# Patient Record
Sex: FEMALE | Race: WHITE
[De-identification: names, ages, dates, MRNs, and addresses within clinical notes are randomized per-mention and may not be internally consistent; named-entity substitution may affect disease eponyms.]

---

## 2017-01-06 ENCOUNTER — HOSPITAL ENCOUNTER (OUTPATIENT)
Age: 67
Discharge: HOME | End: 2017-01-06
Payer: COMMERCIAL

## 2017-01-06 DIAGNOSIS — K21.9: Primary | ICD-10-CM

## 2017-01-23 ENCOUNTER — HOSPITAL ENCOUNTER (OUTPATIENT)
Age: 67
Discharge: HOME | End: 2017-01-23
Payer: COMMERCIAL

## 2017-01-23 DIAGNOSIS — K21.9: Primary | ICD-10-CM

## 2017-12-07 ENCOUNTER — HOSPITAL ENCOUNTER (OUTPATIENT)
Dept: HOSPITAL 76 - LAB.F | Age: 67
Discharge: HOME | End: 2017-12-07
Attending: PHYSICIAN ASSISTANT
Payer: MEDICARE

## 2017-12-07 DIAGNOSIS — E03.9: ICD-10-CM

## 2017-12-07 DIAGNOSIS — E11.9: ICD-10-CM

## 2017-12-07 DIAGNOSIS — E78.5: ICD-10-CM

## 2017-12-07 DIAGNOSIS — Z51.81: Primary | ICD-10-CM

## 2017-12-07 LAB
ALBUMIN/GLOB SERPL: 1.4 {RATIO} (ref 1–2.2)
ANION GAP SERPL CALCULATED.4IONS-SCNC: 5 MMOL/L (ref 6–13)
BASOPHILS NFR BLD AUTO: 0 10^3/UL (ref 0–0.1)
BASOPHILS NFR BLD AUTO: 0.5 %
BILIRUB BLD-MCNC: 0.5 MG/DL (ref 0.2–1)
BUN SERPL-MCNC: 17 MG/DL (ref 6–20)
CALCIUM UR-MCNC: 9.4 MG/DL (ref 8.5–10.3)
CHLORIDE SERPL-SCNC: 105 MMOL/L (ref 101–111)
CHOLEST SERPL-MCNC: 147 MG/DL
CO2 SERPL-SCNC: 28 MMOL/L (ref 21–32)
CREAT SERPLBLD-SCNC: 0.9 MG/DL (ref 0.4–1)
EOSINOPHIL # BLD AUTO: 0.2 10^3/UL (ref 0–0.7)
EOSINOPHIL NFR BLD AUTO: 2.9 %
ERYTHROCYTE [DISTWIDTH] IN BLOOD BY AUTOMATED COUNT: 13.5 % (ref 12–15)
EST. AVERAGE GLUCOSE BLD GHB EST-MCNC: 123 MG/DL (ref 70–100)
GFRSERPLBLD MDRD-ARVRAT: 63 ML/MIN/{1.73_M2} (ref 89–?)
GLOBULIN SER-MCNC: 2.9 G/DL (ref 2.1–4.2)
GLUCOSE SERPL-MCNC: 105 MG/DL (ref 70–100)
HBA1C BLD-MCNC: 0.62 G/DL
HCT VFR BLD AUTO: 42.6 % (ref 37–47)
HDLC SERPL-MCNC: 41 MG/DL
HDLC SERPL: 3.6 {RATIO} (ref ?–4.4)
HGB UR QL STRIP: 14 G/DL (ref 12–16)
LDLC/HDLC SERPL: 1.6 {RATIO} (ref ?–4.4)
LYMPHOCYTES # SPEC AUTO: 2.7 10^3/UL (ref 1.5–3.5)
LYMPHOCYTES NFR BLD AUTO: 33.4 %
MCH RBC QN AUTO: 30.6 PG (ref 27–31)
MCHC RBC AUTO-ENTMCNC: 32.8 G/DL (ref 32–36)
MCV RBC AUTO: 93.4 FL (ref 81–99)
MONOCYTES # BLD AUTO: 0.6 10^3/UL (ref 0–1)
MONOCYTES NFR BLD AUTO: 7.5 %
NEUTROPHILS # BLD AUTO: 4.6 10^3/UL (ref 1.5–6.6)
NEUTROPHILS # SNV AUTO: 8.2 X10^3/UL (ref 4.8–10.8)
NEUTROPHILS NFR BLD AUTO: 55.7 %
NRBC # BLD AUTO: 0 /100WBC
PDW BLD AUTO: 9.6 FL (ref 7.9–10.8)
POTASSIUM SERPL-SCNC: 4.3 MMOL/L (ref 3.5–5)
PROT SPEC-MCNC: 7 G/DL (ref 6.7–8.2)
RBC MAR: 4.57 10^6/UL (ref 4.2–5.4)
SODIUM SERPLBLD-SCNC: 138 MMOL/L (ref 135–145)
TRIGL P FAST SERPL-MCNC: 203 MG/DL
VLDLC SERPL-SCNC: 41 MG/DL
WBC # BLD: 8.2 X10^3/UL

## 2017-12-07 PROCEDURE — 83036 HEMOGLOBIN GLYCOSYLATED A1C: CPT

## 2017-12-07 PROCEDURE — 80061 LIPID PANEL: CPT

## 2017-12-07 PROCEDURE — 84443 ASSAY THYROID STIM HORMONE: CPT

## 2017-12-07 PROCEDURE — 36415 COLL VENOUS BLD VENIPUNCTURE: CPT

## 2017-12-07 PROCEDURE — 85025 COMPLETE CBC W/AUTO DIFF WBC: CPT

## 2017-12-07 PROCEDURE — 80053 COMPREHEN METABOLIC PANEL: CPT

## 2018-06-15 ENCOUNTER — HOSPITAL ENCOUNTER (OUTPATIENT)
Dept: HOSPITAL 76 - LAB.F | Age: 68
Discharge: HOME | End: 2018-06-15
Attending: INTERNAL MEDICINE
Payer: MEDICARE

## 2018-06-15 DIAGNOSIS — E11.9: ICD-10-CM

## 2018-06-15 DIAGNOSIS — E03.9: ICD-10-CM

## 2018-06-15 DIAGNOSIS — E78.5: Primary | ICD-10-CM

## 2018-06-15 LAB
ALBUMIN DIAFP-MCNC: 4 G/DL (ref 3.2–5.5)
ALBUMIN/GLOB SERPL: 1.3 {RATIO} (ref 1–2.2)
ALP SERPL-CCNC: 40 IU/L (ref 42–121)
ALT SERPL W P-5'-P-CCNC: 33 IU/L (ref 10–60)
ANION GAP SERPL CALCULATED.4IONS-SCNC: 7 MMOL/L (ref 6–13)
AST SERPL W P-5'-P-CCNC: 32 IU/L (ref 10–42)
BILIRUB BLD-MCNC: 0.7 MG/DL (ref 0.2–1)
BUN SERPL-MCNC: 20 MG/DL (ref 6–20)
CALCIUM UR-MCNC: 9.5 MG/DL (ref 8.5–10.3)
CHLORIDE SERPL-SCNC: 101 MMOL/L (ref 101–111)
CHOLEST SERPL-MCNC: 156 MG/DL
CO2 SERPL-SCNC: 29 MMOL/L (ref 21–32)
CREAT SERPLBLD-SCNC: 0.8 MG/DL (ref 0.4–1)
EST. AVERAGE GLUCOSE BLD GHB EST-MCNC: 117 MG/DL (ref 70–100)
GFRSERPLBLD MDRD-ARVRAT: 72 ML/MIN/{1.73_M2} (ref 89–?)
GLOBULIN SER-MCNC: 3.1 G/DL (ref 2.1–4.2)
GLUCOSE SERPL-MCNC: 119 MG/DL (ref 70–100)
HB2 TOTAL: 16 G/DL
HBA1C BLD-MCNC: 0.62 G/DL
HDLC SERPL-MCNC: 44 MG/DL
HDLC SERPL: 3.5 {RATIO} (ref ?–4.4)
HEMOGLOBIN A1C %: 5.7 % (ref 4.6–6.2)
LDLC SERPL CALC-MCNC: 70 MG/DL
LDLC/HDLC SERPL: 1.6 {RATIO} (ref ?–4.4)
PROT SPEC-MCNC: 7.1 G/DL (ref 6.7–8.2)
SODIUM SERPLBLD-SCNC: 137 MMOL/L (ref 135–145)
VLDLC SERPL-SCNC: 42 MG/DL

## 2018-06-15 PROCEDURE — 80053 COMPREHEN METABOLIC PANEL: CPT

## 2018-06-15 PROCEDURE — 36415 COLL VENOUS BLD VENIPUNCTURE: CPT

## 2018-06-15 PROCEDURE — 82043 UR ALBUMIN QUANTITATIVE: CPT

## 2018-06-15 PROCEDURE — 80061 LIPID PANEL: CPT

## 2018-06-15 PROCEDURE — 83036 HEMOGLOBIN GLYCOSYLATED A1C: CPT

## 2018-06-15 PROCEDURE — 84443 ASSAY THYROID STIM HORMONE: CPT

## 2018-06-15 PROCEDURE — 83721 ASSAY OF BLOOD LIPOPROTEIN: CPT

## 2019-03-07 ENCOUNTER — HOSPITAL ENCOUNTER (OUTPATIENT)
Dept: HOSPITAL 76 - LAB.F | Age: 69
Discharge: HOME | End: 2019-03-07
Attending: INTERNAL MEDICINE
Payer: MEDICARE

## 2019-03-07 DIAGNOSIS — E03.9: ICD-10-CM

## 2019-03-07 DIAGNOSIS — E11.9: Primary | ICD-10-CM

## 2019-03-07 LAB
ALBUMIN DIAFP-MCNC: 4.1 G/DL (ref 3.2–5.5)
ALBUMIN/GLOB SERPL: 1.5 {RATIO} (ref 1–2.2)
ALP SERPL-CCNC: 47 IU/L (ref 42–121)
ALT SERPL W P-5'-P-CCNC: 30 IU/L (ref 10–60)
ANION GAP SERPL CALCULATED.4IONS-SCNC: 10 MMOL/L (ref 6–13)
AST SERPL W P-5'-P-CCNC: 34 IU/L (ref 10–42)
BILIRUB BLD-MCNC: 0.6 MG/DL (ref 0.2–1)
BUN SERPL-MCNC: 14 MG/DL (ref 6–20)
CALCIUM UR-MCNC: 9.5 MG/DL (ref 8.5–10.3)
CHLORIDE SERPL-SCNC: 101 MMOL/L (ref 101–111)
CO2 SERPL-SCNC: 29 MMOL/L (ref 21–32)
CREAT SERPLBLD-SCNC: 0.8 MG/DL (ref 0.4–1)
CREAT UR-SCNC: 144.6 MG/DL
EST. AVERAGE GLUCOSE BLD GHB EST-MCNC: 120 MG/DL (ref 70–100)
GFRSERPLBLD MDRD-ARVRAT: 72 ML/MIN/{1.73_M2} (ref 89–?)
GLOBULIN SER-MCNC: 2.8 G/DL (ref 2.1–4.2)
GLUCOSE SERPL-MCNC: 123 MG/DL (ref 70–100)
HB2 TOTAL: 15 G/DL
HBA1C BLD-MCNC: 0.6 G/DL
HEMOGLOBIN A1C %: 5.8 % (ref 4.6–6.2)
MICROALBUMIN UR-MCNC: 0.4 MG/DL (ref 0–300)
MICROALBUMIN/CREAT RATIO PNL UR: 2.8 UG/MG (ref ?–30)
PROT SPEC-MCNC: 6.9 G/DL (ref 6.7–8.2)
SODIUM SERPLBLD-SCNC: 140 MMOL/L (ref 135–145)

## 2019-03-07 PROCEDURE — 83036 HEMOGLOBIN GLYCOSYLATED A1C: CPT

## 2019-03-07 PROCEDURE — 82043 UR ALBUMIN QUANTITATIVE: CPT

## 2019-03-07 PROCEDURE — 80053 COMPREHEN METABOLIC PANEL: CPT

## 2019-03-07 PROCEDURE — 84443 ASSAY THYROID STIM HORMONE: CPT

## 2019-03-07 PROCEDURE — 82570 ASSAY OF URINE CREATININE: CPT

## 2019-03-07 PROCEDURE — 36415 COLL VENOUS BLD VENIPUNCTURE: CPT

## 2019-08-11 ENCOUNTER — HOSPITAL ENCOUNTER (OUTPATIENT)
Dept: HOSPITAL 76 - EMS | Age: 69
End: 2019-08-11
Attending: SURGERY
Payer: MEDICARE

## 2019-08-11 DIAGNOSIS — Z04.1: Primary | ICD-10-CM

## 2019-08-14 ENCOUNTER — HOSPITAL ENCOUNTER (OUTPATIENT)
Dept: HOSPITAL 76 - DI.S | Age: 69
Discharge: HOME | End: 2019-08-14
Attending: NURSE PRACTITIONER
Payer: COMMERCIAL

## 2019-08-14 DIAGNOSIS — M51.37: Primary | ICD-10-CM

## 2019-08-14 DIAGNOSIS — M41.86: ICD-10-CM

## 2019-08-14 DIAGNOSIS — M47.816: ICD-10-CM

## 2019-08-14 DIAGNOSIS — M50.31: ICD-10-CM

## 2019-08-14 PROCEDURE — 72100 X-RAY EXAM L-S SPINE 2/3 VWS: CPT

## 2019-08-14 PROCEDURE — 72170 X-RAY EXAM OF PELVIS: CPT

## 2019-08-14 PROCEDURE — 72050 X-RAY EXAM NECK SPINE 4/5VWS: CPT
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
## 2020-05-21 ENCOUNTER — HOSPITAL ENCOUNTER (OUTPATIENT)
Dept: HOSPITAL 76 - LAB | Age: 70
Discharge: HOME | End: 2020-05-21
Attending: INTERNAL MEDICINE
Payer: MEDICARE

## 2020-05-21 DIAGNOSIS — E03.9: ICD-10-CM

## 2020-05-21 DIAGNOSIS — E78.5: ICD-10-CM

## 2020-05-21 DIAGNOSIS — I10: ICD-10-CM

## 2020-05-21 DIAGNOSIS — E11.9: Primary | ICD-10-CM

## 2020-05-21 LAB
ALBUMIN DIAFP-MCNC: 4.1 G/DL (ref 3.2–5.5)
ALBUMIN/GLOB SERPL: 1.3 {RATIO} (ref 1–2.2)
ALP SERPL-CCNC: 54 IU/L (ref 42–121)
ALT SERPL W P-5'-P-CCNC: 22 IU/L (ref 10–60)
ANION GAP SERPL CALCULATED.4IONS-SCNC: 9 MMOL/L (ref 6–13)
AST SERPL W P-5'-P-CCNC: 22 IU/L (ref 10–42)
BILIRUB BLD-MCNC: 0.6 MG/DL (ref 0.2–1)
BUN SERPL-MCNC: 18 MG/DL (ref 6–20)
CALCIUM UR-MCNC: 9.6 MG/DL (ref 8.5–10.3)
CHLORIDE SERPL-SCNC: 103 MMOL/L (ref 101–111)
CHOLEST SERPL-MCNC: 146 MG/DL
CO2 SERPL-SCNC: 27 MMOL/L (ref 21–32)
CREAT SERPLBLD-SCNC: 0.8 MG/DL (ref 0.4–1)
EST. AVERAGE GLUCOSE BLD GHB EST-MCNC: 117 MG/DL (ref 70–100)
GLOBULIN SER-MCNC: 3.1 G/DL (ref 2.1–4.2)
GLUCOSE SERPL-MCNC: 127 MG/DL (ref 70–100)
HB2 TOTAL: 15.9 G/DL
HBA1C BLD-MCNC: 0.62 G/DL
HDLC SERPL-MCNC: 47 MG/DL
HDLC SERPL: 3.1 {RATIO} (ref ?–4.4)
HEMOGLOBIN A1C %: 5.7 % (ref 4.6–6.2)
LDLC SERPL CALC-MCNC: 71 MG/DL
LDLC/HDLC SERPL: 1.5 {RATIO} (ref ?–4.4)
PROT SPEC-MCNC: 7.2 G/DL (ref 6.7–8.2)
SODIUM SERPLBLD-SCNC: 139 MMOL/L (ref 135–145)
VLDLC SERPL-SCNC: 28 MG/DL

## 2020-05-21 PROCEDURE — 36415 COLL VENOUS BLD VENIPUNCTURE: CPT

## 2020-05-21 PROCEDURE — 80061 LIPID PANEL: CPT

## 2020-05-21 PROCEDURE — 84443 ASSAY THYROID STIM HORMONE: CPT

## 2020-05-21 PROCEDURE — 83721 ASSAY OF BLOOD LIPOPROTEIN: CPT

## 2020-05-21 PROCEDURE — 83036 HEMOGLOBIN GLYCOSYLATED A1C: CPT

## 2020-05-21 PROCEDURE — 80053 COMPREHEN METABOLIC PANEL: CPT

## 2021-02-05 ENCOUNTER — HOSPITAL ENCOUNTER (OUTPATIENT)
Dept: HOSPITAL 76 - LAB.S | Age: 71
Discharge: HOME | End: 2021-02-05
Attending: PHYSICIAN ASSISTANT
Payer: MEDICARE

## 2021-02-05 DIAGNOSIS — E78.5: ICD-10-CM

## 2021-02-05 DIAGNOSIS — K21.9: ICD-10-CM

## 2021-02-05 DIAGNOSIS — I25.10: ICD-10-CM

## 2021-02-05 DIAGNOSIS — G47.33: Primary | ICD-10-CM

## 2021-02-05 DIAGNOSIS — E03.9: ICD-10-CM

## 2021-02-05 DIAGNOSIS — I10: ICD-10-CM

## 2021-02-05 DIAGNOSIS — E66.9: ICD-10-CM

## 2021-02-05 DIAGNOSIS — E11.9: ICD-10-CM

## 2021-02-05 LAB
ALBUMIN DIAFP-MCNC: 4.1 G/DL (ref 3.2–5.5)
ALBUMIN/GLOB SERPL: 1.6 {RATIO} (ref 1–2.2)
ALP SERPL-CCNC: 47 IU/L (ref 42–121)
ALT SERPL W P-5'-P-CCNC: 29 IU/L (ref 10–60)
ANION GAP SERPL CALCULATED.4IONS-SCNC: 7 MMOL/L (ref 6–13)
AST SERPL W P-5'-P-CCNC: 26 IU/L (ref 10–42)
BASOPHILS NFR BLD AUTO: 0.1 10^3/UL (ref 0–0.1)
BASOPHILS NFR BLD AUTO: 0.8 %
BILIRUB BLD-MCNC: 0.7 MG/DL (ref 0.2–1)
BUN SERPL-MCNC: 16 MG/DL (ref 6–20)
CALCIUM UR-MCNC: 9.6 MG/DL (ref 8.5–10.3)
CHLORIDE SERPL-SCNC: 101 MMOL/L (ref 101–111)
CHOLEST SERPL-MCNC: 179 MG/DL
CO2 SERPL-SCNC: 28 MMOL/L (ref 21–32)
CREAT SERPLBLD-SCNC: 0.9 MG/DL (ref 0.4–1)
CREAT UR-SCNC: 49.2 MG/DL
EOSINOPHIL # BLD AUTO: 0.2 10^3/UL (ref 0–0.7)
EOSINOPHIL NFR BLD AUTO: 2.1 %
ERYTHROCYTE [DISTWIDTH] IN BLOOD BY AUTOMATED COUNT: 12.9 % (ref 12–15)
GLOBULIN SER-MCNC: 2.6 G/DL (ref 2.1–4.2)
GLUCOSE SERPL-MCNC: 105 MG/DL (ref 70–100)
HBA1C MFR BLD HPLC: 6.2 % (ref 4.27–6.07)
HDLC SERPL-MCNC: 52 MG/DL
HDLC SERPL: 3.4 {RATIO} (ref ?–4.4)
HGB UR QL STRIP: 14.4 G/DL (ref 12–16)
LDLC SERPL CALC-MCNC: 105 MG/DL
LDLC/HDLC SERPL: 2 {RATIO} (ref ?–4.4)
LYMPHOCYTES # SPEC AUTO: 2.5 10^3/UL (ref 1.5–3.5)
LYMPHOCYTES NFR BLD AUTO: 34 %
MCH RBC QN AUTO: 30.8 PG (ref 27–31)
MCHC RBC AUTO-ENTMCNC: 32.1 G/DL (ref 32–36)
MCV RBC AUTO: 96.1 FL (ref 81–99)
MICROALBUMIN UR-MCNC: 0.5 MG/DL (ref 0–300)
MICROALBUMIN/CREAT RATIO PNL UR: 10.2 UG/MG (ref ?–30)
MONOCYTES # BLD AUTO: 0.5 10^3/UL (ref 0–1)
MONOCYTES NFR BLD AUTO: 6.7 %
NEUTROPHILS # BLD AUTO: 4.2 10^3/UL (ref 1.5–6.6)
NEUTROPHILS # SNV AUTO: 7.5 X10^3/UL (ref 4.8–10.8)
NEUTROPHILS NFR BLD AUTO: 56.1 %
PDW BLD AUTO: 11.9 FL (ref 7.9–10.8)
PLATELET # BLD: 184 10^3/UL (ref 130–450)
PROT SPEC-MCNC: 6.7 G/DL (ref 6.7–8.2)
RBC MAR: 4.67 10^6/UL (ref 4.2–5.4)
VLDLC SERPL-SCNC: 22 MG/DL

## 2021-02-05 PROCEDURE — 36415 COLL VENOUS BLD VENIPUNCTURE: CPT

## 2021-02-05 PROCEDURE — 80053 COMPREHEN METABOLIC PANEL: CPT

## 2021-02-05 PROCEDURE — 85025 COMPLETE CBC W/AUTO DIFF WBC: CPT

## 2021-02-05 PROCEDURE — 80061 LIPID PANEL: CPT

## 2021-02-05 PROCEDURE — 83721 ASSAY OF BLOOD LIPOPROTEIN: CPT

## 2021-02-05 PROCEDURE — 82043 UR ALBUMIN QUANTITATIVE: CPT

## 2021-02-05 PROCEDURE — 82570 ASSAY OF URINE CREATININE: CPT

## 2021-02-05 PROCEDURE — 83036 HEMOGLOBIN GLYCOSYLATED A1C: CPT

## 2021-02-05 PROCEDURE — 84443 ASSAY THYROID STIM HORMONE: CPT

## 2021-03-05 ENCOUNTER — HOSPITAL ENCOUNTER (OUTPATIENT)
Dept: HOSPITAL 76 - LAB.R | Age: 71
Discharge: HOME | End: 2021-03-05
Attending: PHYSICIAN ASSISTANT
Payer: MEDICARE

## 2021-03-05 DIAGNOSIS — Z01.812: Primary | ICD-10-CM

## 2021-03-05 DIAGNOSIS — Z20.822: ICD-10-CM

## 2021-03-10 ENCOUNTER — HOSPITAL ENCOUNTER (OUTPATIENT)
Dept: HOSPITAL 76 - SDS | Age: 71
Discharge: HOME | End: 2021-03-10
Attending: ORTHOPAEDIC SURGERY
Payer: MEDICARE

## 2021-03-10 VITALS — SYSTOLIC BLOOD PRESSURE: 128 MMHG | DIASTOLIC BLOOD PRESSURE: 57 MMHG

## 2021-03-10 DIAGNOSIS — F41.9: ICD-10-CM

## 2021-03-10 DIAGNOSIS — Z79.899: ICD-10-CM

## 2021-03-10 DIAGNOSIS — K21.9: ICD-10-CM

## 2021-03-10 DIAGNOSIS — Z79.891: ICD-10-CM

## 2021-03-10 DIAGNOSIS — M79.7: ICD-10-CM

## 2021-03-10 DIAGNOSIS — E03.9: ICD-10-CM

## 2021-03-10 DIAGNOSIS — M75.111: Primary | ICD-10-CM

## 2021-03-10 DIAGNOSIS — E11.9: ICD-10-CM

## 2021-03-10 DIAGNOSIS — Z79.82: ICD-10-CM

## 2021-03-10 DIAGNOSIS — G47.33: ICD-10-CM

## 2021-03-10 DIAGNOSIS — M67.813: ICD-10-CM

## 2021-03-10 DIAGNOSIS — E66.9: ICD-10-CM

## 2021-03-10 DIAGNOSIS — E78.5: ICD-10-CM

## 2021-03-10 DIAGNOSIS — I10: ICD-10-CM

## 2021-03-10 DIAGNOSIS — I25.119: ICD-10-CM

## 2021-03-10 DIAGNOSIS — M32.9: ICD-10-CM

## 2021-03-10 DIAGNOSIS — M19.90: ICD-10-CM

## 2021-03-10 DIAGNOSIS — F32.9: ICD-10-CM

## 2021-03-10 DIAGNOSIS — Z79.84: ICD-10-CM

## 2021-03-10 DIAGNOSIS — Z87.891: ICD-10-CM

## 2021-03-10 PROCEDURE — 29828 SHO ARTHRS SRG BICP TENODSIS: CPT

## 2021-03-10 PROCEDURE — 29827 SHO ARTHRS SRG RT8TR CUF RPR: CPT

## 2021-03-10 NOTE — ANESTHESIA
Pre-Anesthesia VS, & Labs





- Diagnosis





torn biceps and rotator cuff





- Procedure





right shoulder arthroscopy, rotator cuff and biceps tenotomy


Vital Signs: 





                                        











Temp Pulse Resp BP Pulse Ox


 


 36.0 C L  67   18   141/64 H  99 


 


 03/10/21 06:45  03/10/21 06:45  03/10/21 06:45  03/10/21 06:45  03/10/21 06:45











Height: 5 ft 5 in


Weight (kg): 82.1 kg


Body Mass Index: 30.1


BMI Classification: Obese





- NPO


>8 hours





- Pregnancy


Is Patient Pregnant?: No





- Lab Results


Current Lab Results: 





Laboratory Tests





03/10/21 07:01: POC Whole Bld Glucose 116 H











Home Medications and Allergies


Home Medications: 


Ambulatory Orders





ALPRAZolam [Alprazolam] 0.5 mg PO DAILY PRN 03/04/21 


Ascorbic Acid [Vitamin C] 1,000 mg PO DAILY 03/04/21 


Biotin 5 mg PO DAILY 03/04/21 


Metoprolol Tartrate [Lopressor] 25 mg PO BID 03/04/21 


Omega-3S/Dha/Epa/Fish Oil [Fish Oil 1,200 mg Softgel] 1 each PO DAILY 03/04/21 


Psyllium Husk [Metamucil] 2 cap PO DAILY 03/04/21 


Red Yeast Rice 1 cap PO DAILY 03/04/21 


Ubidecarenone/Vit E Acet [Co Q-10 100 mg Softgel] 1 each PO DAILY 03/04/21 











                                        





Amlodipine Besylate 2.5 mg PO QAM 02/21/14 


Aspirin [Aspir 81] 81 mg PO DAILY 02/21/14 


Cholecalciferol (Vitamin D3) [Vitamin D3] 20,000 unit PO DAILY 02/21/14 


Isosorbide Mononitrate ER [Imdur] 30 mg PO DAILY 02/21/14 


Metformin HCl 500 mg PO BID 02/21/14 


Nitroglycerin [Nitrostat] 0.4 mg SL Q5MIN PRN 02/21/14 


Simvastatin [Zocor] 40 mg PO QPM 02/21/14 


ALPRAZolam [Alprazolam] 0.5 mg PO DAILY PRN 03/04/21 


Ascorbic Acid [Vitamin C] 1,000 mg PO DAILY 03/04/21 


Biotin 5 mg PO DAILY 03/04/21 


Metoprolol Tartrate [Lopressor] 25 mg PO BID 03/04/21 


Omega-3S/Dha/Epa/Fish Oil [Fish Oil 1,200 mg Softgel] 1 each PO DAILY 03/04/21 


Psyllium Husk [Metamucil] 2 cap PO DAILY 03/04/21 


Red Yeast Rice 1 cap PO DAILY 03/04/21 


Ubidecarenone/Vit E Acet [Co Q-10 100 mg Softgel] 1 each PO DAILY 03/04/21 








Allergies/Adverse Reactions: 


                                    Allergies











Allergy/AdvReac Type Severity Reaction Status Date / Time


 


clindamycin Allergy Severe Respiratory Verified 02/21/14 10:14


 


Sulfa (Sulfonamide Allergy Severe Respiratory Verified 02/21/14 10:14





Antibiotics)     


 


venom-wasp Allergy Severe Anaphylaxis Verified 03/04/21 13:22


 


Cephalosporins Allergy Intermediate Rash Verified 02/21/14 10:14














Anes History & Medical History





- Anesthetic History


Anesthesia Complications: reports: Other-see comment (allergy to antibiotic)





- Medical History


Cardiovascular: reports: Hypertension, High cholesterol, Coronary artery disease


Pulmonary: reports: Sleep apnea


Gastrointestinal: reports: GERD, Diverticulitis


Urinary: reports: None


Musculoskeletal: reports: Osteoarthritis, Fibromyalgia


Endocrine/Autoimmune: reports: Type 2 diabetes


Skin: reports: Other


Smoking Status: Former smoker


History of Cancer?: No





- Surgical History


Eyes Ears Nose Throat (EENT): reports: Tonsil/Adenoidectomy, Other


Gynecologic: reports: Endometrial ablation, Dilation and currettage, Tubal 

ligation, Other





Exam


General: Alert


Dental: WNL


Mouth Opening: Greater than 4 Fingerbreadths


Neck Mobility: Normal


Mallampati classification: II


Respiratory: Lungs clear


Cardiovascular: Regular rate, Normal S1, Normal S2





Plan


Anesthesia Type: General, Interscalene Block


Consent for Procedure(s) Verified and Reviewed: Yes


Code Status: Attempt Resuscitation


ASA classification: 3-Severe systemic disease


Is this case an emergency?: No

## 2021-03-10 NOTE — OPERATIVE REPORT
Operative Report





- General


Procedure Date: 03/10/21


Planned Procedure: 





Arthroscopy right shoulder, arthroscopic biceps tenotomy and rotator cuff repair


Pre-Op Diagnosis: Torn biceps tendon and rotator cuff right shoulder


Procedure Performed: 





Arthroscopic biceps tenotomy and rotator cuff repair right shoulder using 

Arthrex single row repair with rip stop stitch


Post Op Diagnosis: Same as preoperative diagnosis





- Procedure Note


Primary Surgeon: Vikash Rodas MD


Secondary Surgeon: Lang WELLS


Anesthesia Provider: Joi Crouch CRNA


Anesthesia Technique: General ET tube, Regional block


Estimated Blood Loss (mL): 5


Indications: 





This is a 70-year-old woman with chronic pain to her right shoulder associated 

with activities to her right shoulder.  She had painful arc of motion, 

tenderness over the greater tuberosity, pain with can and empty can testing, 

positive Speed test, MRI scan suggesting biceps tendinosis and a full-thickness 

supraspinatus rotator cuff tear right shoulder with mild retraction.  She tried 

conservative treatment including physical therapy and has had persistent 

symptoms


Findings: 





She had a near complete rupture of the long head of the biceps tendon just 

before insertion into the glenoid.  There was a stab of tendon remnant.  The 

glenohumeral joint showed abnormal footprint, fraying of the labrum, 

subscapularis intact, normal-appearing articular cartilage to glenoid and 

humeral head.  On subacromial arthroscopy there was a full-thickness crescent 

wound and 1/2 cm tear with about a centimeter of retraction.  The remainder of 

the rotator cuff although appeared thin and friable did not show any complete 

tear.


Complications: 





None





- Other


Other Information/Narrative: 





Patient was brought to the operating room.  She is given a interscalene block 

and general anesthesia.  She was positioned in the beachchair positioner with 

about 60 degrees of elevation of the head.  Her head and neck were stabilized 

with the beachchair positioner and a padded facemask.  Her neck was placed in a 

relatively neutral position.  An arm hendrickson was not available.  The right 

shoulder and upper extremity were prepped and draped in a sterile manner in the 

usual fashion.  A timeout procedure was performed by the entire operating room 

team and all were in agreement.  She did receive levofloxacin because she is 

allergic to cephalosporins..





The bony landmarks of the shoulder were outlined a sterile marking pen.  A 

posterior portal was established for the arthroscope.  The posterior joint was 

entered with a blunt probe into the glenohumeral joint.  The [place] 4 mm 3 

degree of black diagnostic arthroscope was utilized in conjunction with the 

[employer] arthroscopic pump.  Inflow was brought through the arthroscope.  

Complete glenohumeral arthroscopy was performed.  An anterior portal was 

established in the safe triangle under direct visualization.  The biceps tendon 

was debrided, localized synovitis removed and frayed tendon edges were removed 

with the radiofrequency probe made by [employer] [place].  The arthroscope was then 

introduced into the subacromial space again using the posterior portal.  A 

lateral subacromial portal was used and a bursectomy was performed to visualize 

the subacromial space and rotator cuff tendon.  The tendon tear involves 

primarily the supraspinatus, a crescent shaped tear with about a centimeter 

retraction.  The tendon was mobilized by removing the bursa surrounding the 

tear.  A cannula was placed anteriorly and in the lateral subacromial portal.  

The Arthrex repair was utilized using a single row repair.  The scorpion was 

used the past a horizontal mattress fiber tape suture which was taken out the 

anterior cannula.  2-0 FiberWire was then passed in a horizontal mattress suture

but more medial to act as a ripstop stitch.  All 4 strands of suture were 

brought out through the large cannula in the lateral portal.  A bone awl was 

used to make a hole for the 5.5 mm swivel lock.  The sutures were taken through 

the tip of the swivel lock.  The swivel lock was introduced through the cannula,

sutures were tensioned under direct visualization and the suture anchor was 

pushed and threaded into the bone hole in the tuberosity.  There is seem to be 

good fixation of the 5.5 mm diameter swivel lock.  The suture ends were cut.  

The incision sites were closed with 3-0 nylon, dry sterile dressing with 

Xeroform and a sling with a abduction brace was applied to right upper 

extremity.  She tolerated the procedure well.  A surgical assistant was utilized

to facilitate with shuttling of suture, suture management and cutting suture.

## 2021-03-10 NOTE — ANESTHESIA POST OP EVALUATION
Anesthesia Post Eval





- Post Anesthesia Eval


Vitals: 





                                Last Vital Signs











Temp  36.0 C L  03/10/21 12:24


 


Pulse  85   03/10/21 12:43


 


Resp  14   03/10/21 12:43


 


BP  132/63 H  03/10/21 12:43


 


Pulse Ox  96   03/10/21 12:43











CV Function Including HR & BP: positive: Stable


Pain Control: positive: Satisfactory


Nausea & Vomiting: positive: Negative


Mental Status: positive: Baseline


Respiratory Status: Airway Patent


Hydration Status: Satisfactory


Anesthesia Complications: positive: None

## 2022-03-18 ENCOUNTER — HOSPITAL ENCOUNTER (OUTPATIENT)
Dept: HOSPITAL 76 - LAB.S | Age: 72
Discharge: HOME | End: 2022-03-18
Attending: NURSE PRACTITIONER
Payer: MEDICARE

## 2022-03-18 DIAGNOSIS — G47.33: ICD-10-CM

## 2022-03-18 DIAGNOSIS — E78.5: ICD-10-CM

## 2022-03-18 DIAGNOSIS — I10: Primary | ICD-10-CM

## 2022-03-18 DIAGNOSIS — E03.9: ICD-10-CM

## 2022-03-18 LAB
ALBUMIN DIAFP-MCNC: 4.2 G/DL (ref 3.2–5.5)
ALBUMIN/GLOB SERPL: 1.7 {RATIO} (ref 1–2.2)
ALP SERPL-CCNC: 48 IU/L (ref 42–121)
ALT SERPL W P-5'-P-CCNC: 34 IU/L (ref 10–60)
ANION GAP SERPL CALCULATED.4IONS-SCNC: 10 MMOL/L (ref 6–13)
AST SERPL W P-5'-P-CCNC: 30 IU/L (ref 10–42)
BASOPHILS NFR BLD AUTO: 0.1 10^3/UL (ref 0–0.1)
BASOPHILS NFR BLD AUTO: 0.9 %
BILIRUB BLD-MCNC: 0.5 MG/DL (ref 0.2–1)
BUN SERPL-MCNC: 18 MG/DL (ref 6–20)
CALCIUM UR-MCNC: 9.6 MG/DL (ref 8.5–10.3)
CHLORIDE SERPL-SCNC: 101 MMOL/L (ref 101–111)
CHOLEST SERPL-MCNC: 143 MG/DL
CO2 SERPL-SCNC: 28 MMOL/L (ref 21–32)
CREAT SERPLBLD-SCNC: 0.9 MG/DL (ref 0.4–1)
EOSINOPHIL # BLD AUTO: 0.1 10^3/UL (ref 0–0.7)
EOSINOPHIL NFR BLD AUTO: 2.1 %
ERYTHROCYTE [DISTWIDTH] IN BLOOD BY AUTOMATED COUNT: 12.9 % (ref 12–15)
GFRSERPLBLD MDRD-ARVRAT: 62 ML/MIN/{1.73_M2} (ref 89–?)
GLOBULIN SER-MCNC: 2.5 G/DL (ref 2.1–4.2)
GLUCOSE SERPL-MCNC: 115 MG/DL (ref 70–100)
HCT VFR BLD AUTO: 45.1 % (ref 37–47)
HDLC SERPL-MCNC: 45 MG/DL
HDLC SERPL: 3.2 {RATIO} (ref ?–4.4)
HGB UR QL STRIP: 14.6 G/DL (ref 12–16)
LDLC SERPL CALC-MCNC: 76 MG/DL
LDLC/HDLC SERPL: 1.7 {RATIO} (ref ?–4.4)
LYMPHOCYTES # SPEC AUTO: 2.3 10^3/UL (ref 1.5–3.5)
LYMPHOCYTES NFR BLD AUTO: 34.3 %
MCH RBC QN AUTO: 30.9 PG (ref 27–31)
MCHC RBC AUTO-ENTMCNC: 32.4 G/DL (ref 32–36)
MCV RBC AUTO: 95.6 FL (ref 81–99)
MONOCYTES # BLD AUTO: 0.6 10^3/UL (ref 0–1)
MONOCYTES NFR BLD AUTO: 9.2 %
NEUTROPHILS # BLD AUTO: 3.5 10^3/UL (ref 1.5–6.6)
NEUTROPHILS # SNV AUTO: 6.6 X10^3/UL (ref 4.8–10.8)
NEUTROPHILS NFR BLD AUTO: 53.3 %
NRBC # BLD AUTO: 0 /100WBC
NRBC # BLD AUTO: 0 X10^3/UL
PDW BLD AUTO: 12 FL (ref 7.9–10.8)
PLATELET # BLD: 214 10^3/UL (ref 130–450)
POTASSIUM SERPL-SCNC: 4.5 MMOL/L (ref 3.5–5)
PROT SPEC-MCNC: 6.7 G/DL (ref 6.7–8.2)
RBC MAR: 4.72 10^6/UL (ref 4.2–5.4)
SODIUM SERPLBLD-SCNC: 139 MMOL/L (ref 135–145)
TRIGL P FAST SERPL-MCNC: 110 MG/DL
TSH SERPL-ACNC: 2.59 UIU/ML (ref 0.34–5.6)
VLDLC SERPL-SCNC: 22 MG/DL

## 2022-03-18 PROCEDURE — 83721 ASSAY OF BLOOD LIPOPROTEIN: CPT

## 2022-03-18 PROCEDURE — 85025 COMPLETE CBC W/AUTO DIFF WBC: CPT

## 2022-03-18 PROCEDURE — 80053 COMPREHEN METABOLIC PANEL: CPT

## 2022-03-18 PROCEDURE — 80061 LIPID PANEL: CPT

## 2022-03-18 PROCEDURE — 36415 COLL VENOUS BLD VENIPUNCTURE: CPT

## 2022-03-18 PROCEDURE — 84443 ASSAY THYROID STIM HORMONE: CPT
